# Patient Record
(demographics unavailable — no encounter records)

---

## 2024-12-20 NOTE — PHYSICAL EXAM
[Chaperone Present] : A chaperone was present in the examining room during all aspects of the physical examination [de-identified] : Prostate somewhat enlarged for age, + point tenderness pelvic floor [FreeTextEntry2] : Hanna MUNGUIA

## 2024-12-20 NOTE — HISTORY OF PRESENT ILLNESS
[FreeTextEntry1] : Name MISAEL CHEUNG  MRN [id number]   1988  Contact Number: [phone] ----------------------------------------------------------------------------------------------------------------------------------------- Date of First Visit: 24 Referring Provider/PCP: in between -----------------------------------------------------------------------------------------------------------------------------------------  CC: urinary frequency  History of Present Illness: MISAEL CHEUNG is a 36 year old male who presents for evaluation urinary frequency. Present for about 2 years. Goes every 1-1.5 hours. Associated urgency. Nocturia 3-5x/night. Night more bothersome given interfering with sleep. Present every day. Better when decreases fluid. Does not always feel like fully empties - feels empty at time but then needs to go shortly after. No incontinence. Stream is ok, not great. Stream continuous, no stop and start. Strains to get it started. Saw urologist in the past - tried trospium, worked somewhat but then stopped and didn't follow-up. Also was less bothersome then but now living with someone and partner findings it bothersome at night.  No history of UTIs. No history of STIs. 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

## 2024-12-20 NOTE — ASSESSMENT
[FreeTextEntry1] : 36 year old with 2 years frequency urgency, nocturia, hesitancy. Exam notable for point tenderness pelvic floor and prostate enlarged for age.  Suspect there is an element of pelvic floor dysfunction/dysfunctional voiding. We discussed behavioral modification strategies including fluid restriction or additional hydration; avoidance of certain foods known to be common bladder irritants such as coffee or citrus products; use of an elimination diet to determine which foods or fluids may contribute to symptoms; pelvic floor muscle relaxation and bladder training with urge suppression with pelvic floor PT.   Given enlarged prostate we also discussed role of Alfuzosin and potential benefit. The side effects of Alfuzosin were discussed in detail. These include but are not limited to hives, changes in vision, orthostatic hypotension, dizziness, congestion, and retrograde ejaculation. Additionally, the patient should notify his ophthalmologist prior to any surgery for cataracts given the risk of floppy iris syndrome. Discussed for now would hold on meds for OAB symptoms given hesitancy.   Plan: - UA, culture - alfuzosin given prostate somewhat enlarged on exam and hesitancy - avoidance bladder triggers - avoid fluids 4 hours before bed - PFPT - fu 3 months - uroflow and PVR - discussed role of cysto in future - wants to hold off for now

## 2024-12-20 NOTE — HISTORY OF PRESENT ILLNESS
[FreeTextEntry1] : Name MISAEL CHEUNG  MRN [id number]   1988  Contact Number: [phone] ----------------------------------------------------------------------------------------------------------------------------------------- Date of First Visit: 24 Referring Provider/PCP: in between -----------------------------------------------------------------------------------------------------------------------------------------  CC: urinary frequency  History of Present Illness: MISAEL CHEUNG is a 36 year old male who presents for evaluation urinary frequency. Present for about 2 years. Goes every 1-1.5 hours. Associated urgency. Nocturia 3-5x/night. Night more bothersome given interfering with sleep. Present every day. Better when decreases fluid. Does not always feel like fully empties - feels empty at time but then needs to go shortly after. No incontinence. Stream is ok, not great. Stream continuous, no stop and start. Strains to get it started. Saw urologist in the past - tried trospium, worked somewhat but then stopped and didn't follow-up. Also was less bothersome then but now living with someone and partner findings it bothersome at night.  No history of UTIs. No history of STIs. 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

## 2024-12-20 NOTE — PHYSICAL EXAM
[Chaperone Present] : A chaperone was present in the examining room during all aspects of the physical examination [de-identified] : Prostate somewhat enlarged for age, + point tenderness pelvic floor [FreeTextEntry2] : Hanna MUNGUIA

## 2025-03-27 NOTE — ASSESSMENT
[FreeTextEntry1] : 36 year old with 2 years frequency urgency, nocturia, hesitancy. Exam notable for point tenderness pelvic floor and prostate enlarged for age. Last visit started alfuzosin with significant improvement in daytime symptoms - still with nocturia. Recently started PFPT as well.   Discussed options for optimizing - wants to hold on any additional meds for now and will determine impact PFPT. Discussed with regards to nocturia - give snoring will set up referral for WIL evaluation. Will plan for voiding diary to evaluate.   Plan: - continue alfuzosin - discussed addition of beta agonist but starting PFPT now so will hold off and see how does - voiding diary - sleep referral - avoid fluids 4 hours before bed - fu after sleep study after completes PFPT

## 2025-03-27 NOTE — HISTORY OF PRESENT ILLNESS
[FreeTextEntry1] : Name MISAEL CHEUNG  MRN [id number]   1988  Contact Number: [phone] ----------------------------------------------------------------------------------------------------------------------------------------- Date of First Visit: 24 Referring Provider/PCP: in between -----------------------------------------------------------------------------------------------------------------------------------------  CC: urinary frequency  History of Present Illness: MISAEL CHEUNG is a 36 year old male who presents for evaluation urinary frequency. Present for about 2 years. Goes every 1-1.5 hours. Associated urgency. Nocturia 3-5x/night. Night more bothersome given interfering with sleep. Present every day. Better when decreases fluid. Does not always feel like fully empties - feels empty at time but then needs to go shortly after. No incontinence. Stream is ok, not great. Stream continuous, no stop and start. Strains to get it started. Saw urologist in the past - tried trospium, worked somewhat but then stopped and didn't follow-up. Also was less bothersome then but now living with someone and partner findings it bothersome at night.  No history of UTIs. No history of STIs. No history of hematuria   Bladder triggers: caffeine equivalent to 2 cups/day (200mg) in AM, carbonation daily, no citrus, moderate spicy foods, occasional tomatoes, rare pineapple, no alcohol, no smoking   Comorbid conditions: no neurologic diseases, no mobility deficits, no constipation, + jaw clenching  IPSS 27 QOL 3 SUJATA 22 PVR (to ensure adequate emptying): 1 ---------------------------------------------------------------------------------------------------------------------------------------- Interval History (2025):  Last visit started alfuzosin. Tolerating well. Works great during day. Less frequency, less straining, better flow, no more hesitancy. Still with urgency. Just started PFPT as well. Nighttime still going 3-4x/night. Started new ADHD med and decreased caffeine. However still drinking caffeine into the afternoon. Still drinking carbonation. Small volume voids at night but waking up from sleep. Snoring when sleeps on back. Does drink fluids up to 1-2 hours before bed.   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

## 2025-03-27 NOTE — HISTORY OF PRESENT ILLNESS
[FreeTextEntry1] : Name MISAEL CHEUNG  MRN [id number]   1988  Contact Number: [phone] ----------------------------------------------------------------------------------------------------------------------------------------- Date of First Visit: 24 Referring Provider/PCP: in between -----------------------------------------------------------------------------------------------------------------------------------------  CC: urinary frequency  History of Present Illness: MISAEL CHEUNG is a 36 year old male who presents for evaluation urinary frequency. Present for about 2 years. Goes every 1-1.5 hours. Associated urgency. Nocturia 3-5x/night. Night more bothersome given interfering with sleep. Present every day. Better when decreases fluid. Does not always feel like fully empties - feels empty at time but then needs to go shortly after. No incontinence. Stream is ok, not great. Stream continuous, no stop and start. Strains to get it started. Saw urologist in the past - tried trospium, worked somewhat but then stopped and didn't follow-up. Also was less bothersome then but now living with someone and partner findings it bothersome at night.  No history of UTIs. No history of STIs. No history of hematuria   Bladder triggers: caffeine equivalent to 2 cups/day (200mg) in AM, carbonation daily, no citrus, moderate spicy foods, occasional tomatoes, rare pineapple, no alcohol, no smoking   Comorbid conditions: no neurologic diseases, no mobility deficits, no constipation, + jaw clenching  IPSS 27 QOL 3 SUJATA 22 PVR (to ensure adequate emptying): 1 ---------------------------------------------------------------------------------------------------------------------------------------- Interval History (2025):  Last visit started alfuzosin. Tolerating well. Works great during day. Less frequency, less straining, better flow, no more hesitancy. Still with urgency. Just started PFPT as well. Nighttime still going 3-4x/night. Started new ADHD med and decreased caffeine. However still drinking caffeine into the afternoon. Still drinking carbonation. Small volume voids at night but waking up from sleep. Snoring when sleeps on back. Does drink fluids up to 1-2 hours before bed.   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

## 2025-05-15 NOTE — HISTORY OF PRESENT ILLNESS
[FreeTextEntry1] : Name MISAEL CHEUNG  MRN [id number]   1988  Contact Number: [phone] ----------------------------------------------------------------------------------------------------------------------------------------- Date of First Visit: 24 Referring Provider/PCP: in between -----------------------------------------------------------------------------------------------------------------------------------------  CC: urinary frequency  History of Present Illness: MISAEL CHEUNG is a 36 year old male who presents for evaluation urinary frequency. Present for about 2 years. Goes every 1-1.5 hours. Associated urgency. Nocturia 3-5x/night. Night more bothersome given interfering with sleep. Present every day. Better when decreases fluid. Does not always feel like fully empties - feels empty at time but then needs to go shortly after. No incontinence. Stream is ok, not great. Stream continuous, no stop and start. Strains to get it started. Saw urologist in the past - tried trospium, worked somewhat but then stopped and didn't follow-up. Also was less bothersome then but now living with someone and partner findings it bothersome at night.  No history of UTIs. No history of STIs. No history of hematuria   Bladder triggers: caffeine equivalent to 2 cups/day (200mg) in AM, carbonation daily, no citrus, moderate spicy foods, occasional tomatoes, rare pineapple, no alcohol, no smoking   Comorbid conditions: no neurologic diseases, no mobility deficits, no constipation, + jaw clenching  IPSS 27 QOL 3 SUJATA 22 PVR (to ensure adequate emptying): 1 ---------------------------------------------------------------------------------------------------------------------------------------- Interval History (2025):  Last visit started alfuzosin. Tolerating well. Works great during day. Less frequency, less straining, better flow, no more hesitancy. Still with urgency. Just started PFPT as well. Nighttime still going 3-4x/night. Started new ADHD med and decreased caffeine. However still drinking caffeine into the afternoon. Still drinking carbonation. Small volume voids at night but waking up from sleep. Snoring when sleeps on back. Does drink fluids up to 1-2 hours before bed.   IPSS 14 QOL 3 SUJATA 23 PVR (to ensure adequate emptying): 7 ---------------------------------------------------------------------------------------------------------------------------------------- Interval History (05/15/2025): Has been doing PFPT and alfuzosin. Daytime great, no issues. Feels bladder training going well. Nocturia varies 2-4x. Sleep study not till next month. Has decreased fluid before bed - maybe  a little improvement when withholds longer before bed. Drinking caffeine in AM, usually done by 12PM. Still reports snoring when sleeps on back. Trying TENS with PFPT.  Did voiding diary recording timing of voids but not amounts. 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

## 2025-05-15 NOTE — ASSESSMENT
[FreeTextEntry1] : 36 year old with 2 years frequency urgency, nocturia, hesitancy. Exam notable for point tenderness pelvic floor and prostate enlarged for age. Started alfuzosin and PFPT with significant improvement in daytime symptoms - still with nocturia. Has been withholding fluids and decreasing PM triggers. WIL eval pending.  Discussed options for optimizing - wants to hold on any additional meds for now and will determine impact further PFPT. Will wait to see impact of TENS on nocturia and will see results WIL eval and will go from there. Did voiding diary recording timing of voids but not amounts and will record amounts for next visit.  Plan: - continue alfuzosin - discussed addition of beta agonist but will do sleep eval first - voiding diary - sleep referral - appt 6/26/25 - avoid fluids 4 hours before bed and decrease PM triggers - fu after sleep study ~ 3 months